# Patient Record
Sex: MALE | Race: WHITE | NOT HISPANIC OR LATINO
[De-identification: names, ages, dates, MRNs, and addresses within clinical notes are randomized per-mention and may not be internally consistent; named-entity substitution may affect disease eponyms.]

---

## 2017-09-20 ENCOUNTER — TRANSCRIPTION ENCOUNTER (OUTPATIENT)
Age: 62
End: 2017-09-20

## 2018-11-06 ENCOUNTER — APPOINTMENT (OUTPATIENT)
Dept: UROLOGY | Facility: CLINIC | Age: 63
End: 2018-11-06
Payer: MEDICAID

## 2018-11-06 DIAGNOSIS — N43.3 HYDROCELE, UNSPECIFIED: ICD-10-CM

## 2018-11-06 PROCEDURE — 99203 OFFICE O/P NEW LOW 30 MIN: CPT

## 2018-11-06 RX ORDER — ATORVASTATIN CALCIUM 80 MG/1
TABLET, FILM COATED ORAL
Refills: 0 | Status: ACTIVE | COMMUNITY

## 2018-11-08 ENCOUNTER — FORM ENCOUNTER (OUTPATIENT)
Age: 63
End: 2018-11-08

## 2018-11-09 ENCOUNTER — OUTPATIENT (OUTPATIENT)
Dept: OUTPATIENT SERVICES | Facility: HOSPITAL | Age: 63
LOS: 1 days | End: 2018-11-09
Payer: MEDICAID

## 2018-11-09 ENCOUNTER — APPOINTMENT (OUTPATIENT)
Dept: ULTRASOUND IMAGING | Facility: CLINIC | Age: 63
End: 2018-11-09
Payer: MEDICAID

## 2018-11-09 DIAGNOSIS — Z00.8 ENCOUNTER FOR OTHER GENERAL EXAMINATION: ICD-10-CM

## 2018-11-09 PROCEDURE — 76870 US EXAM SCROTUM: CPT

## 2018-11-09 PROCEDURE — 76870 US EXAM SCROTUM: CPT | Mod: 26

## 2018-12-14 ENCOUNTER — APPOINTMENT (OUTPATIENT)
Dept: UROLOGY | Facility: CLINIC | Age: 63
End: 2018-12-14

## 2020-07-13 ENCOUNTER — TRANSCRIPTION ENCOUNTER (OUTPATIENT)
Age: 65
End: 2020-07-13

## 2021-01-02 ENCOUNTER — EMERGENCY (EMERGENCY)
Facility: HOSPITAL | Age: 66
LOS: 1 days | Discharge: HOME | End: 2021-01-02
Attending: EMERGENCY MEDICINE
Payer: COMMERCIAL

## 2021-01-02 VITALS
SYSTOLIC BLOOD PRESSURE: 143 MMHG | DIASTOLIC BLOOD PRESSURE: 94 MMHG | TEMPERATURE: 98 F | RESPIRATION RATE: 19 BRPM | WEIGHT: 179.9 LBS | HEART RATE: 94 BPM | OXYGEN SATURATION: 94 % | HEIGHT: 68 IN

## 2021-01-02 VITALS
DIASTOLIC BLOOD PRESSURE: 72 MMHG | HEART RATE: 74 BPM | SYSTOLIC BLOOD PRESSURE: 125 MMHG | RESPIRATION RATE: 18 BRPM | OXYGEN SATURATION: 98 %

## 2021-01-02 DIAGNOSIS — Z98.890 OTHER SPECIFIED POSTPROCEDURAL STATES: Chronic | ICD-10-CM

## 2021-01-02 LAB
ALBUMIN SERPL ELPH-MCNC: 4.1 G/DL — SIGNIFICANT CHANGE UP (ref 3.3–5)
ALP SERPL-CCNC: 103 U/L — SIGNIFICANT CHANGE UP (ref 40–120)
ALT FLD-CCNC: 29 U/L — SIGNIFICANT CHANGE UP (ref 10–45)
ANION GAP SERPL CALC-SCNC: 11 MMOL/L — SIGNIFICANT CHANGE UP (ref 5–17)
APPEARANCE UR: CLEAR — SIGNIFICANT CHANGE UP
AST SERPL-CCNC: 15 U/L — SIGNIFICANT CHANGE UP (ref 10–40)
BACTERIA # UR AUTO: NEGATIVE — SIGNIFICANT CHANGE UP
BASOPHILS # BLD AUTO: 0 K/UL — SIGNIFICANT CHANGE UP (ref 0–0.2)
BASOPHILS NFR BLD AUTO: 0 % — SIGNIFICANT CHANGE UP (ref 0–2)
BILIRUB SERPL-MCNC: 0.3 MG/DL — SIGNIFICANT CHANGE UP (ref 0.2–1.2)
BILIRUB UR-MCNC: NEGATIVE — SIGNIFICANT CHANGE UP
BUN SERPL-MCNC: 19 MG/DL — SIGNIFICANT CHANGE UP (ref 7–23)
CALCIUM SERPL-MCNC: 9.3 MG/DL — SIGNIFICANT CHANGE UP (ref 8.4–10.5)
CHLORIDE SERPL-SCNC: 100 MMOL/L — SIGNIFICANT CHANGE UP (ref 96–108)
CO2 SERPL-SCNC: 26 MMOL/L — SIGNIFICANT CHANGE UP (ref 22–31)
COLOR SPEC: YELLOW — SIGNIFICANT CHANGE UP
CREAT SERPL-MCNC: 1.09 MG/DL — SIGNIFICANT CHANGE UP (ref 0.5–1.3)
DIFF PNL FLD: ABNORMAL
EOSINOPHIL # BLD AUTO: 0.12 K/UL — SIGNIFICANT CHANGE UP (ref 0–0.5)
EOSINOPHIL NFR BLD AUTO: 0.8 % — SIGNIFICANT CHANGE UP (ref 0–6)
EPI CELLS # UR: 1 /HPF — SIGNIFICANT CHANGE UP
GLUCOSE SERPL-MCNC: 107 MG/DL — HIGH (ref 70–99)
GLUCOSE UR QL: NEGATIVE — SIGNIFICANT CHANGE UP
HCT VFR BLD CALC: 42.8 % — SIGNIFICANT CHANGE UP (ref 39–50)
HGB BLD-MCNC: 14 G/DL — SIGNIFICANT CHANGE UP (ref 13–17)
HYALINE CASTS # UR AUTO: 5 /LPF — HIGH (ref 0–2)
KETONES UR-MCNC: SIGNIFICANT CHANGE UP
LEUKOCYTE ESTERASE UR-ACNC: ABNORMAL
LYMPHOCYTES # BLD AUTO: 21.4 % — SIGNIFICANT CHANGE UP (ref 13–44)
LYMPHOCYTES # BLD AUTO: 3.33 K/UL — HIGH (ref 1–3.3)
MANUAL SMEAR VERIFICATION: SIGNIFICANT CHANGE UP
MCHC RBC-ENTMCNC: 27.2 PG — SIGNIFICANT CHANGE UP (ref 27–34)
MCHC RBC-ENTMCNC: 32.7 GM/DL — SIGNIFICANT CHANGE UP (ref 32–36)
MCV RBC AUTO: 83.1 FL — SIGNIFICANT CHANGE UP (ref 80–100)
MONOCYTES # BLD AUTO: 1.87 K/UL — HIGH (ref 0–0.9)
MONOCYTES NFR BLD AUTO: 12 % — SIGNIFICANT CHANGE UP (ref 2–14)
NEUTROPHILS # BLD AUTO: 9.58 K/UL — HIGH (ref 1.8–7.4)
NEUTROPHILS NFR BLD AUTO: 61.5 % — SIGNIFICANT CHANGE UP (ref 43–77)
NITRITE UR-MCNC: NEGATIVE — SIGNIFICANT CHANGE UP
PH UR: 6 — SIGNIFICANT CHANGE UP (ref 5–8)
PLAT MORPH BLD: NORMAL — SIGNIFICANT CHANGE UP
PLATELET # BLD AUTO: 313 K/UL — SIGNIFICANT CHANGE UP (ref 150–400)
POTASSIUM SERPL-MCNC: 4 MMOL/L — SIGNIFICANT CHANGE UP (ref 3.5–5.3)
POTASSIUM SERPL-SCNC: 4 MMOL/L — SIGNIFICANT CHANGE UP (ref 3.5–5.3)
PROT SERPL-MCNC: 8.5 G/DL — HIGH (ref 6–8.3)
PROT UR-MCNC: ABNORMAL
RBC # BLD: 5.15 M/UL — SIGNIFICANT CHANGE UP (ref 4.2–5.8)
RBC # FLD: 11.9 % — SIGNIFICANT CHANGE UP (ref 10.3–14.5)
RBC BLD AUTO: NORMAL — SIGNIFICANT CHANGE UP
RBC CASTS # UR COMP ASSIST: 7 /HPF — HIGH (ref 0–4)
SARS-COV-2 RNA SPEC QL NAA+PROBE: SIGNIFICANT CHANGE UP
SODIUM SERPL-SCNC: 137 MMOL/L — SIGNIFICANT CHANGE UP (ref 135–145)
SP GR SPEC: 1.03 — HIGH (ref 1.01–1.02)
UROBILINOGEN FLD QL: NEGATIVE — SIGNIFICANT CHANGE UP
VARIANT LYMPHS # BLD: 4.3 % — SIGNIFICANT CHANGE UP (ref 0–6)
WBC # BLD: 15.58 K/UL — HIGH (ref 3.8–10.5)
WBC # FLD AUTO: 15.58 K/UL — HIGH (ref 3.8–10.5)
WBC UR QL: 43 /HPF — HIGH (ref 0–5)

## 2021-01-02 PROCEDURE — 81001 URINALYSIS AUTO W/SCOPE: CPT

## 2021-01-02 PROCEDURE — 87086 URINE CULTURE/COLONY COUNT: CPT

## 2021-01-02 PROCEDURE — 71045 X-RAY EXAM CHEST 1 VIEW: CPT

## 2021-01-02 PROCEDURE — 99284 EMERGENCY DEPT VISIT MOD MDM: CPT | Mod: 25

## 2021-01-02 PROCEDURE — 96374 THER/PROPH/DIAG INJ IV PUSH: CPT

## 2021-01-02 PROCEDURE — 99285 EMERGENCY DEPT VISIT HI MDM: CPT

## 2021-01-02 PROCEDURE — 74176 CT ABD & PELVIS W/O CONTRAST: CPT | Mod: 26

## 2021-01-02 PROCEDURE — 87635 SARS-COV-2 COVID-19 AMP PRB: CPT

## 2021-01-02 PROCEDURE — U0005: CPT

## 2021-01-02 PROCEDURE — 74176 CT ABD & PELVIS W/O CONTRAST: CPT

## 2021-01-02 PROCEDURE — 85025 COMPLETE CBC W/AUTO DIFF WBC: CPT

## 2021-01-02 PROCEDURE — 80053 COMPREHEN METABOLIC PANEL: CPT

## 2021-01-02 PROCEDURE — 71045 X-RAY EXAM CHEST 1 VIEW: CPT | Mod: 26

## 2021-01-02 RX ORDER — CEPHALEXIN 500 MG
1 CAPSULE ORAL
Qty: 10 | Refills: 0
Start: 2021-01-02 | End: 2021-01-06

## 2021-01-02 RX ORDER — CEFTRIAXONE 500 MG/1
1000 INJECTION, POWDER, FOR SOLUTION INTRAMUSCULAR; INTRAVENOUS ONCE
Refills: 0 | Status: COMPLETED | OUTPATIENT
Start: 2021-01-02 | End: 2021-01-02

## 2021-01-02 RX ADMIN — CEFTRIAXONE 100 MILLIGRAM(S): 500 INJECTION, POWDER, FOR SOLUTION INTRAMUSCULAR; INTRAVENOUS at 08:03

## 2021-01-02 NOTE — ED PROVIDER NOTE - NSFOLLOWUPINSTRUCTIONS_ED_ALL_ED_FT
No signs of emergency medical condition on today's workup.  Presumptive diagnosis made, but further evaluation may be required by your primary care doctor or specialist for a definitive diagnosis.  Therefore, follow up as directed and if symptoms change/worsen or any emergency conditions, please return to the ER.  Please follow up with primary care physician for CT results.   For pain or fever you can ibuprofen (motrin, advil) or tylenol as needed, as directed on packaging.   If needed, call patient access services at [phone] to find a primary care doctor, or call at [phone] to make an appointment at the clinic.

## 2021-01-02 NOTE — ED ADULT NURSE NOTE - OBJECTIVE STATEMENT
comes from home with 3day hx of chills, cough, aches; had been taking Advil regularly; denies sob; no previous positive covid tests; no known exposures to covid; pt is alert and oriented x 4; no acute distress noted; placed on isolation to rule out COVID; isolation precations explained to pt.

## 2021-01-02 NOTE — ED PROVIDER NOTE - NS ED ROS FT
CONSTITUTIONAL: CHILLS, FATIGUE. No fevers, lightheadedness, weakness, unexpected weight change  HEENT: No vision changes, discharge from eyes, nasal congestion, runny nose, sore throat, ear pain or discharge  CV: No chest pain  PULM: COUGH. No shortness of breath  GI: No abdominal pain, nausea, vomiting, diarrhea, constipation  : +difficulty urinating. No dysuria, polyuria, hematuria  SKIN: No rashes, swelling, open wounds or ulcers  MSK: No muscle aches, joint aches  NEURO: No headache

## 2021-01-02 NOTE — ED PROVIDER NOTE - PROGRESS NOTE DETAILS
Tino Jalloh D.O., PGY2 (Resident)  Patient with + RBC, leuk esterase, WBCs, no bacteria. Sterile pyuria. Given patient symptomatic, will tx w/ ceftriaxone, get CT a/p non contrast to eval for obstructive renal path Resident [place]: pt received as a sign out at resident signout - CT pending - no stone, panculitis noted, denies abd pain, nonttp - pt is informed abt results, follow up with primary care physician. Abx to pharmacy.

## 2021-01-02 NOTE — ED PROVIDER NOTE - PHYSICAL EXAMINATION
GENERAL: elderly male, sitting in bed, NAD. Vital signs are within normal limits  HEENT: NC/AT, conjunctiva noninjected, sclera anicteric, moist mucous membranes, no tonsillar exudate  NECK: Supple, trachea midline, no LAD  LUNG: Nonlabored respirations, no wheezes, rales  CV: RRR, no murmurs, Pulses- Radial: 2+ b/l  ABDOMEN: Nondistended, nontender  MSK: No visible deformities, nontender extremities, moving all 4 extremities spont.  NEURO: AAOx4 (to person, place, time, event), no tremor, steady gait  PSYCH: Normal mood and affect

## 2021-01-02 NOTE — ED PROVIDER NOTE - CLINICAL SUMMARY MEDICAL DECISION MAKING FREE TEXT BOX
65M well appearing here for uri sxs w/o fever, gradual onset headache, difficulty urinating w/o other urinary sxs. vitals wnl. Exam benign. Likely mild viral URI. Eval for possible covid pattern vs PNA on CXR. UA to eval for possible underlying urinary infection or hematuria given difficulty with urination. CXR. COVID swab. 65M well appearing here for uri sxs w/o fever, gradual onset headache, difficulty urinating w/o other urinary sxs. vitals wnl. Exam benign. Likely mild viral URI. Eval for possible covid pattern vs PNA on CXR. UA to eval for possible underlying urinary infection or hematuria given difficulty with urination. CXR. COVID swab.  No e/o sepsis.  Nontoxic appearing.  Hopeful d/c assuming no emergent conditions identified.  --BMM

## 2021-01-02 NOTE — ED PROVIDER NOTE - PATIENT PORTAL LINK FT
You can access the FollowMyHealth Patient Portal offered by [facility] by registering at the following website: [email or web address]. By joining "[facility]"’s FollowMyHealth portal, you will also be able to view your health information using other applications (apps) compatible with our system.

## 2021-01-03 LAB
CULTURE RESULTS: SIGNIFICANT CHANGE UP
SPECIMEN SOURCE: SIGNIFICANT CHANGE UP

## 2021-12-02 ENCOUNTER — TRANSCRIPTION ENCOUNTER (OUTPATIENT)
Age: 66
End: 2021-12-02

## 2023-12-30 ENCOUNTER — NON-APPOINTMENT (OUTPATIENT)
Age: 68
End: 2023-12-30

## 2024-08-12 ENCOUNTER — NON-APPOINTMENT (OUTPATIENT)
Age: 69
End: 2024-08-12

## 2024-08-14 PROBLEM — E78.5 HYPERLIPIDEMIA, UNSPECIFIED: Chronic | Status: ACTIVE | Noted: 2021-01-02

## 2024-08-15 ENCOUNTER — NON-APPOINTMENT (OUTPATIENT)
Age: 69
End: 2024-08-15

## 2024-08-15 ENCOUNTER — APPOINTMENT (OUTPATIENT)
Dept: INTERNAL MEDICINE | Facility: CLINIC | Age: 69
End: 2024-08-15
Payer: MEDICARE

## 2024-08-15 DIAGNOSIS — Z00.00 ENCOUNTER FOR GENERAL ADULT MEDICAL EXAMINATION W/OUT ABNORMAL FINDINGS: ICD-10-CM

## 2024-08-15 PROCEDURE — 94060 EVALUATION OF WHEEZING: CPT

## 2024-08-15 PROCEDURE — 94729 DIFFUSING CAPACITY: CPT

## 2024-08-15 PROCEDURE — 94726 PLETHYSMOGRAPHY LUNG VOLUMES: CPT

## 2024-08-19 ENCOUNTER — APPOINTMENT (OUTPATIENT)
Dept: INTERNAL MEDICINE | Facility: CLINIC | Age: 69
End: 2024-08-19
Payer: MEDICARE

## 2024-08-19 VITALS
DIASTOLIC BLOOD PRESSURE: 70 MMHG | HEART RATE: 63 BPM | OXYGEN SATURATION: 98 % | HEIGHT: 69 IN | BODY MASS INDEX: 27.4 KG/M2 | SYSTOLIC BLOOD PRESSURE: 104 MMHG | WEIGHT: 185 LBS | TEMPERATURE: 98 F

## 2024-08-19 DIAGNOSIS — Z87.891 PERSONAL HISTORY OF NICOTINE DEPENDENCE: ICD-10-CM

## 2024-08-19 DIAGNOSIS — F17.200 NICOTINE DEPENDENCE, UNSPECIFIED, UNCOMPLICATED: ICD-10-CM

## 2024-08-19 DIAGNOSIS — Z78.9 OTHER SPECIFIED HEALTH STATUS: ICD-10-CM

## 2024-08-19 DIAGNOSIS — R05.3 CHRONIC COUGH: ICD-10-CM

## 2024-08-19 DIAGNOSIS — J45.909 UNSPECIFIED ASTHMA, UNCOMPLICATED: ICD-10-CM

## 2024-08-19 DIAGNOSIS — J06.9 ACUTE UPPER RESPIRATORY INFECTION, UNSPECIFIED: ICD-10-CM

## 2024-08-19 PROCEDURE — 99214 OFFICE O/P EST MOD 30 MIN: CPT

## 2024-08-19 PROCEDURE — G2211 COMPLEX E/M VISIT ADD ON: CPT

## 2024-08-19 RX ORDER — ATORVASTATIN CALCIUM 20 MG/1
20 TABLET, FILM COATED ORAL
Qty: 90 | Refills: 0 | Status: ACTIVE | COMMUNITY
Start: 2023-12-28

## 2024-08-19 RX ORDER — PROMETHAZINE HYDROCHLORIDE AND DEXTROMETHORPHAN HYDROBROMIDE ORAL SOLUTION 15; 6.25 MG/5ML; MG/5ML
6.25-15 SOLUTION ORAL
Qty: 100 | Refills: 0 | Status: COMPLETED | COMMUNITY
Start: 2024-08-13

## 2024-08-19 RX ORDER — ALBUTEROL SULFATE 90 UG/1
108 (90 BASE) INHALANT RESPIRATORY (INHALATION)
Qty: 8 | Refills: 0 | Status: COMPLETED | COMMUNITY
Start: 2024-08-13

## 2024-08-19 NOTE — PROCEDURE
[FreeTextEntry1] : See scanned PFT report = discussed with patient in detail Normal spirometry/lung volumes/DLCO

## 2024-08-19 NOTE — HISTORY OF PRESENT ILLNESS
[Former] : former [< 20 pack-years] : < 20 pack-years [Never] : never [Feelings Of Weakness On Exertion] : exercise intolerance [Difficulty Breathing During Exertion] : dyspnea on exertion [Wheezing] : wheezing [Nasal Passage Blockage (Stuffiness)] : edema [Nonspecific Pain, Swelling, And Stiffness] : chest pain [Cough] : coughing [Fever] : fever [Does not check] : The patient is not checking peak flow at home [0  -  Nothing at all] : 0, nothing at all [Class I - No Symptoms and No Limitations] : I [Patient/Caregiver unclear of wishes] : Patient/Caregiver unclear of wishes [Wt Gain ___ kg] : No recent weight gain [Wt Loss ___ kg] : No recent weight loss [More Frequent Use Needed Recently] : Patient reports no recent increase in frequency of [TextBox_4] : The patient comes in for an initial pulmonary evaluation regarding persistent cough. He denies any history of pleurisy, remote bronchitis, tuberculosis, asthma, COPD, pulmonary embolism, sleep apnea, pneumothorax, lung cancer.  He reports a history of walking pneumonia treated with outpatient antibiotics.  He denies any history of lung surgery.  There is a history of lung cancer in the remote relative.  The patient is  and works in the carpet business.  He states that he has exposure to dusts.  He denies any radiation exposure.  He reports that he did have mold in his basement approximately 1 year ago.  He apparently did mold clearance using heavy-duty bleach.  Despite wearing a mask he reports that he did have a cough for several days later.  The patient discontinued smoking approximately 15 years ago.  Prior to that he states that he smoked 4 to 5 cigarettes/day for at least 20 years.  He denies alcohol or drug use.  He reports that he was born in [place].  He has lived in [place] and [place] since the 1970s.  He has not had any recent travel.  He did have a dog in his home last year.  He recently had a chest x-ray and PFTs.  He states that he is up-to-date with influenza, pneumococcal, and COVID vaccination.  He states that he has not had chest imaging with chest CT. He reports that he became ill with an upper respiratory illness.  He initially had rhinitis and postnasal drip.  He then developed a severe and persisting cough.  It was productive of sputum.  He eventually after approximately 4 weeks went to urgent care.  He was treated with an albuterol MDI and promethazine cough syrup.  He had a chest x-ray which was unremarkable.  He presently denies any chest pain.  He reports that his cough is completely gone.  He denies any sputum production or hemoptysis.  He denies any fevers/chills/sweats.  He denies any weight loss.  He denies any edema or calf pain. [TextBox_11] : Less than 1/2 pack/day [TextBox_13] : 20 years [YearQuit] : 15 years ago [de-identified] : Denies hemoptysis [ESS] : 0 [TextBox_57] : 2024 = clear chest x-ray [AdvancecareDate] : 08/24

## 2024-08-19 NOTE — PHYSICAL EXAM
[No Acute Distress] : no acute distress [Well Nourished] : well nourished [Well Groomed] : well groomed [Well Developed] : well developed [Normal Oropharynx] : normal oropharynx [Supple] : supple [No JVD] : no jvd [Normal Rate/Rhythm] : normal rate/rhythm [Normal S1, S2] : normal s1, s2 [No Resp Distress] : no resp distress [No Acc Muscle Use] : no acc muscle use [Normal Rhythm and Effort] : normal rhythm and effort [Clear to Auscultation Bilaterally] : clear to auscultation bilaterally [No Abnormalities] : no abnormalities [Benign] : benign [Normal Gait] : normal gait [No Clubbing] : no clubbing [No Cyanosis] : no cyanosis [No Edema] : no edema [FROM] : FROM [Normal Color/ Pigmentation] : normal color/ pigmentation [No Focal Deficits] : no focal deficits [Oriented x3] : oriented x3 [Normal Affect] : normal affect [TextBox_11] : No sinus tenderness; pupils reactive to light; extraocular muscles intact; TMs clear [TextBox_44] : No stridor [TextBox_54] : No cords [TextBox_68] : R = 16; normal air entry; no wheezing

## 2024-08-19 NOTE — ASSESSMENT
[FreeTextEntry1] : Former smoker Prior history of walking pneumonia Recent acute likely viral URI Developed persistent cough in setting of persistent postnasal drip and probable asthmatic bronchitis triggered by viral illness Cough has completely resolved Recent chest x-ray clear Recent PFTs within normal limits Vaccines reviewed No longer smokes Exercise program encouraged Weight control Carry albuterol MDI for as needed use Should he develop a recurrent URI he will come in to be seen as soon as possible At this time he wishes to return in follow-up as needed He will call if his status changes or worsens or for any pulmonary issues and return to be seen immediately All of the above was discussed in detail with him and all questions were answered He verbally confirmed understanding of all of the above and agreement with the above plan

## 2024-11-20 PROBLEM — K40.90 RIGHT INGUINAL HERNIA: Status: ACTIVE | Noted: 2024-11-20

## 2024-11-21 ENCOUNTER — APPOINTMENT (OUTPATIENT)
Dept: SURGERY | Facility: CLINIC | Age: 69
End: 2024-11-21
Payer: MEDICARE

## 2024-11-21 VITALS
HEIGHT: 69 IN | RESPIRATION RATE: 16 BRPM | TEMPERATURE: 97.3 F | HEART RATE: 65 BPM | WEIGHT: 185 LBS | OXYGEN SATURATION: 99 % | DIASTOLIC BLOOD PRESSURE: 75 MMHG | SYSTOLIC BLOOD PRESSURE: 137 MMHG | BODY MASS INDEX: 27.4 KG/M2

## 2024-11-21 DIAGNOSIS — K40.90 UNILATERAL INGUINAL HERNIA, W/OUT OBSTRUCTION OR GANGRENE, NOT SPECIFIED AS RECURRENT: ICD-10-CM

## 2024-11-21 PROCEDURE — 99203 OFFICE O/P NEW LOW 30 MIN: CPT

## 2024-12-04 ENCOUNTER — OUTPATIENT (OUTPATIENT)
Dept: OUTPATIENT SERVICES | Facility: HOSPITAL | Age: 69
LOS: 1 days | End: 2024-12-04
Payer: COMMERCIAL

## 2024-12-04 VITALS
HEART RATE: 66 BPM | TEMPERATURE: 99 F | SYSTOLIC BLOOD PRESSURE: 120 MMHG | DIASTOLIC BLOOD PRESSURE: 80 MMHG | OXYGEN SATURATION: 97 % | RESPIRATION RATE: 16 BRPM | HEIGHT: 68 IN

## 2024-12-04 DIAGNOSIS — Z98.890 OTHER SPECIFIED POSTPROCEDURAL STATES: Chronic | ICD-10-CM

## 2024-12-04 DIAGNOSIS — K40.90 UNILATERAL INGUINAL HERNIA, WITHOUT OBSTRUCTION OR GANGRENE, NOT SPECIFIED AS RECURRENT: ICD-10-CM

## 2024-12-04 DIAGNOSIS — Z87.19 PERSONAL HISTORY OF OTHER DISEASES OF THE DIGESTIVE SYSTEM: ICD-10-CM

## 2024-12-04 LAB
ANION GAP SERPL CALC-SCNC: 11 MMOL/L — SIGNIFICANT CHANGE UP (ref 5–17)
BUN SERPL-MCNC: 16 MG/DL — SIGNIFICANT CHANGE UP (ref 7–23)
CALCIUM SERPL-MCNC: 9.1 MG/DL — SIGNIFICANT CHANGE UP (ref 8.4–10.5)
CHLORIDE SERPL-SCNC: 104 MMOL/L — SIGNIFICANT CHANGE UP (ref 96–108)
CO2 SERPL-SCNC: 23 MMOL/L — SIGNIFICANT CHANGE UP (ref 22–31)
CREAT SERPL-MCNC: 0.85 MG/DL — SIGNIFICANT CHANGE UP (ref 0.5–1.3)
EGFR: 94 ML/MIN/1.73M2 — SIGNIFICANT CHANGE UP
GLUCOSE SERPL-MCNC: 88 MG/DL — SIGNIFICANT CHANGE UP (ref 70–99)
HCT VFR BLD CALC: 44.4 % — SIGNIFICANT CHANGE UP (ref 39–50)
HGB BLD-MCNC: 14.3 G/DL — SIGNIFICANT CHANGE UP (ref 13–17)
MCHC RBC-ENTMCNC: 26.3 PG — LOW (ref 27–34)
MCHC RBC-ENTMCNC: 32.2 G/DL — SIGNIFICANT CHANGE UP (ref 32–36)
MCV RBC AUTO: 81.6 FL — SIGNIFICANT CHANGE UP (ref 80–100)
MRSA PCR RESULT.: SIGNIFICANT CHANGE UP
NRBC # BLD: 0 /100 WBCS — SIGNIFICANT CHANGE UP (ref 0–0)
PLATELET # BLD AUTO: 303 K/UL — SIGNIFICANT CHANGE UP (ref 150–400)
POTASSIUM SERPL-MCNC: 4.4 MMOL/L — SIGNIFICANT CHANGE UP (ref 3.5–5.3)
POTASSIUM SERPL-SCNC: 4.4 MMOL/L — SIGNIFICANT CHANGE UP (ref 3.5–5.3)
RBC # BLD: 5.44 M/UL — SIGNIFICANT CHANGE UP (ref 4.2–5.8)
RBC # FLD: 12.3 % — SIGNIFICANT CHANGE UP (ref 10.3–14.5)
S AUREUS DNA NOSE QL NAA+PROBE: DETECTED
SODIUM SERPL-SCNC: 138 MMOL/L — SIGNIFICANT CHANGE UP (ref 135–145)
WBC # BLD: 9.43 K/UL — SIGNIFICANT CHANGE UP (ref 3.8–10.5)
WBC # FLD AUTO: 9.43 K/UL — SIGNIFICANT CHANGE UP (ref 3.8–10.5)

## 2024-12-04 PROCEDURE — 87640 STAPH A DNA AMP PROBE: CPT

## 2024-12-04 PROCEDURE — 87641 MR-STAPH DNA AMP PROBE: CPT

## 2024-12-04 PROCEDURE — G0463: CPT

## 2024-12-04 PROCEDURE — 85027 COMPLETE CBC AUTOMATED: CPT

## 2024-12-04 PROCEDURE — 80048 BASIC METABOLIC PNL TOTAL CA: CPT

## 2024-12-04 NOTE — H&P PST ADULT - HISTORY OF PRESENT ILLNESS
68 yo male, PMH HLD, left inguinal hernia repair several years ago, reports acute pain on the right groin area right after her picked up something heavy, no bulge. Pt. presents to PST for scheduled Robotic Right Inguinal Repair with Mesh on 12/11/24. Denies recent fever, chills, chest pain, SOB, changes in bowel/urinary habits or recent exposure to COVID-19 infection. Pt. denies clotting or bleeding disorders.

## 2024-12-04 NOTE — H&P PST ADULT - NEGATIVE GASTROINTESTINAL SYMPTOMS
no nausea/no vomiting/no change in bowel habits no nausea/no vomiting/no change in bowel habits/no flatulence/no abdominal pain/no melena/no hematochezia/no steatorrhea

## 2024-12-04 NOTE — H&P PST ADULT - NEGATIVE GENERAL GENITOURINARY SYMPTOMS
no bladder infections/normal urinary frequency no hematuria/no bladder infections/normal urinary frequency

## 2024-12-04 NOTE — H&P PST ADULT - NSICDXPROCEDURE_GEN_ALL_CORE_FT
PROCEDURES:  Laparoscopy with repair of recurrent inguinal hernia 04-Dec-2024 15:14:55  Cookie Gonzalez

## 2024-12-04 NOTE — H&P PST ADULT - PROBLEM SELECTOR PLAN 1
Robot right Inguinal Hernia Repair with Mesh on 12/11/24  Pre-op instructions, including Chlorhexidine soap, provided - all questions answered  CBC, BMP done at [facility]  Pt. was told he can continue aspirin 81 mg. during porsha-op period

## 2024-12-04 NOTE — H&P PST ADULT - NSICDXPASTMEDICALHX_GEN_ALL_CORE_FT
PAST MEDICAL HISTORY:  HLD (hyperlipidemia)      PAST MEDICAL HISTORY:  History of neck pain     History of right inguinal hernia     HLD (hyperlipidemia)

## 2024-12-04 NOTE — H&P PST ADULT - NEGATIVE NEUROLOGICAL SYMPTOMS
no transient paralysis/no weakness/no vertigo no transient paralysis/no weakness/no syncope/no tremors/no vertigo/no difficulty walking

## 2024-12-04 NOTE — H&P PST ADULT - ASSESSMENT
Activity:    DASI scale:    Mallampati:    Dentition: carries heave object at his work Activity: My job requires me do a lot of physical work, that's how I got hurt, picking up something heavy    DASI scale: 8.25    Mallampati: 2    Dentition: Denies loose teeth/dentures

## 2024-12-05 DIAGNOSIS — A49.01 METHICILLIN SUSCEPTIBLE STAPHYLOCOCCUS AUREUS INFECTION, UNSPECIFIED SITE: ICD-10-CM

## 2024-12-05 RX ORDER — MUPIROCIN 20 MG/G
2 OINTMENT TOPICAL
Qty: 1 | Refills: 0 | Status: ACTIVE | COMMUNITY
Start: 2024-12-05 | End: 1900-01-01

## 2024-12-11 ENCOUNTER — TRANSCRIPTION ENCOUNTER (OUTPATIENT)
Age: 69
End: 2024-12-11

## 2024-12-11 ENCOUNTER — APPOINTMENT (OUTPATIENT)
Dept: SURGERY | Facility: HOSPITAL | Age: 69
End: 2024-12-11
Payer: MEDICARE

## 2024-12-11 ENCOUNTER — OUTPATIENT (OUTPATIENT)
Dept: OUTPATIENT SERVICES | Facility: HOSPITAL | Age: 69
LOS: 1 days | End: 2024-12-11
Payer: MEDICARE

## 2024-12-11 VITALS
HEART RATE: 83 BPM | SYSTOLIC BLOOD PRESSURE: 129 MMHG | OXYGEN SATURATION: 100 % | RESPIRATION RATE: 16 BRPM | DIASTOLIC BLOOD PRESSURE: 85 MMHG | TEMPERATURE: 98 F

## 2024-12-11 VITALS
OXYGEN SATURATION: 97 % | HEIGHT: 67.99 IN | HEART RATE: 65 BPM | RESPIRATION RATE: 16 BRPM | TEMPERATURE: 98 F | DIASTOLIC BLOOD PRESSURE: 78 MMHG | SYSTOLIC BLOOD PRESSURE: 137 MMHG | WEIGHT: 184.97 LBS

## 2024-12-11 DIAGNOSIS — K40.90 UNILATERAL INGUINAL HERNIA, WITHOUT OBSTRUCTION OR GANGRENE, NOT SPECIFIED AS RECURRENT: ICD-10-CM

## 2024-12-11 DIAGNOSIS — Z98.890 OTHER SPECIFIED POSTPROCEDURAL STATES: Chronic | ICD-10-CM

## 2024-12-11 PROCEDURE — 49650 LAP ING HERNIA REPAIR INIT: CPT | Mod: RT

## 2024-12-11 PROCEDURE — C1781: CPT

## 2024-12-11 PROCEDURE — C9399: CPT

## 2024-12-11 PROCEDURE — S2900: CPT

## 2024-12-11 DEVICE — MESH HERNIA INGUINAL PROGRIP LAPAROSCOPIC 15 X 10CM RIGHT: Type: IMPLANTABLE DEVICE | Site: RIGHT | Status: FUNCTIONAL

## 2024-12-11 RX ORDER — 0.9 % SODIUM CHLORIDE 0.9 %
1000 INTRAVENOUS SOLUTION INTRAVENOUS
Refills: 0 | Status: DISCONTINUED | OUTPATIENT
Start: 2024-12-11 | End: 2024-12-11

## 2024-12-11 RX ORDER — LIDOCAINE HCL 20 MG/ML
0.2 VIAL (ML) INJECTION ONCE
Refills: 0 | Status: DISCONTINUED | OUTPATIENT
Start: 2024-12-11 | End: 2024-12-11

## 2024-12-11 RX ORDER — CEFAZOLIN SODIUM 10 G
2000 VIAL (EA) INJECTION ONCE
Refills: 0 | Status: COMPLETED | OUTPATIENT
Start: 2024-12-11 | End: 2024-12-11

## 2024-12-11 RX ORDER — IBUPROFEN 200 MG
1 TABLET ORAL
Refills: 0 | DISCHARGE

## 2024-12-11 RX ORDER — HYDROMORPHONE HYDROCHLORIDE 2 MG/1
0.2 TABLET ORAL
Refills: 0 | Status: DISCONTINUED | OUTPATIENT
Start: 2024-12-11 | End: 2024-12-11

## 2024-12-11 RX ORDER — ONDANSETRON HYDROCHLORIDE 4 MG/1
4 TABLET, FILM COATED ORAL ONCE
Refills: 0 | Status: DISCONTINUED | OUTPATIENT
Start: 2024-12-11 | End: 2024-12-11

## 2024-12-11 RX ORDER — CHOLECALCIFEROL (VITAMIN D3) 10MCG/0.25
0 DROPS ORAL
Refills: 0 | DISCHARGE

## 2024-12-11 RX ORDER — SODIUM CHLORIDE 9 MG/ML
3 INJECTION, SOLUTION INTRAMUSCULAR; INTRAVENOUS; SUBCUTANEOUS EVERY 8 HOURS
Refills: 0 | Status: DISCONTINUED | OUTPATIENT
Start: 2024-12-11 | End: 2024-12-11

## 2024-12-11 RX ORDER — CHLORHEXIDINE GLUCONATE 1.2 MG/ML
1 RINSE ORAL ONCE
Refills: 0 | Status: DISCONTINUED | OUTPATIENT
Start: 2024-12-11 | End: 2024-12-11

## 2024-12-11 RX ORDER — OXYCODONE HYDROCHLORIDE 30 MG/1
5 TABLET ORAL ONCE
Refills: 0 | Status: DISCONTINUED | OUTPATIENT
Start: 2024-12-11 | End: 2024-12-11

## 2024-12-11 RX ORDER — HYDROMORPHONE HYDROCHLORIDE 2 MG/1
0.5 TABLET ORAL
Refills: 0 | Status: DISCONTINUED | OUTPATIENT
Start: 2024-12-11 | End: 2024-12-11

## 2024-12-11 RX ADMIN — Medication 75 MILLILITER(S): at 18:54

## 2024-12-11 RX ADMIN — HYDROMORPHONE HYDROCHLORIDE 0.2 MILLIGRAM(S): 2 TABLET ORAL at 20:10

## 2024-12-11 RX ADMIN — OXYCODONE HYDROCHLORIDE 5 MILLIGRAM(S): 30 TABLET ORAL at 21:15

## 2024-12-11 RX ADMIN — OXYCODONE HYDROCHLORIDE 5 MILLIGRAM(S): 30 TABLET ORAL at 20:45

## 2024-12-11 RX ADMIN — HYDROMORPHONE HYDROCHLORIDE 0.2 MILLIGRAM(S): 2 TABLET ORAL at 18:55

## 2024-12-11 RX ADMIN — HYDROMORPHONE HYDROCHLORIDE 0.2 MILLIGRAM(S): 2 TABLET ORAL at 19:55

## 2024-12-11 RX ADMIN — HYDROMORPHONE HYDROCHLORIDE 0.2 MILLIGRAM(S): 2 TABLET ORAL at 19:10

## 2024-12-11 NOTE — PRE-ANESTHESIA EVALUATION ADULT - NSANTHPMHFT_GEN_ALL_CORE
70 yo male, PMH HLD, left inguinal hernia repair several years ago, reports acute pain on the right groin area right after her picked up something heavy, no bulge. Pt. presents to PST for scheduled Robotic Right Inguinal Repair with Mesh on 12/11/24. Denies recent fever, chills, chest pain, SOB, changes in bowel/urinary habits or recent exposure to COVID-19 infection. Pt. denies clotting or bleeding disorders.

## 2024-12-11 NOTE — BRIEF OPERATIVE NOTE - OPERATION/FINDINGS
right direct inguinal hernia with a lipoma of the cord. Transabdominal preperitoneal repair performed using a [id number] mesh (Right anatomical progrip mesh).

## 2024-12-11 NOTE — ASU DISCHARGE PLAN (ADULT/PEDIATRIC) - CARE PROVIDER_API CALL
Yordy Perla  Surgery  [address] [place] [address], [address]  [place] [address]  Phone: [phone]  Fax: [phone]  Established Patient  Follow Up Time: 2 weeks  
Confusion

## 2024-12-11 NOTE — ASU DISCHARGE PLAN (ADULT/PEDIATRIC) - ASU DC SPECIAL INSTRUCTIONSFT
WOUND CARE:  Please keep incisions clean and dry. Please do not Scrub or rub incisions. Do not use lotion or powder on incisions. CAN REMOVE OP SITES (GAUZE/CLEAR DRESSING) IN 2 DAYS. PLEASE DO NOT REMOVE STERI STRIPS - THEY WILL FALL OFF ON THEIR OWN OR DR. DIMAS WILL REMOVE THEM AT YOUR NEXT POSTOPERATIVE APPOINTMENT.   BATHING: Please do not submerge wound underwater. You may shower and/or sponge bathe. CAN SHOWER WHEN ABLE.   ACTIVITY: No heavy lifting or straining. Otherwise, you may return to your usual level of physical activity. If you are taking narcotic pain medication (such as Percocet) DO NOT drive a car, operate machinery or make important decisions. CAN TAKE TYLENOL 1000MG Q6H AND MOTRIN 600MG Q6H ALTERNATING FOR PAIN SO THAT ONE IS TAKEN EVERY 3 HOURS.   DIET: Return to your usual diet.  NOTIFY YOUR SURGEON IF: You have any bleeding that does not stop, any pus draining from your wound(s), any fever (over 100.4 F) or chills, persistent nausea/vomiting, persistent diarrhea, or if your pain is not controlled on your discharge pain medications.  FOLLOW-UP: Please follow up with your primary care physician. Please follow-up with your surgeon, within 2 WEEKS - please call to schedule an appointment.

## 2024-12-11 NOTE — ASU DISCHARGE PLAN (ADULT/PEDIATRIC) - FINANCIAL ASSISTANCE
[facility] provides services at a reduced cost to those who are determined to be eligible through [facility]’s financial assistance program. Information regarding [facility]’s financial assistance program can be found by going to [email or web address] or by calling [phone].

## 2024-12-11 NOTE — ASU DISCHARGE PLAN (ADULT/PEDIATRIC) - MEDICATION INSTRUCTIONS
CAN TAKE TYLENOL 1000MG Q6H AND MOTRIN 600MG Q6H ALTERNATING FOR PAIN SO THAT ONE IS TAKEN EVERY 3 HOURS.

## 2024-12-11 NOTE — ASU PATIENT PROFILE, ADULT - FALL HARM RISK - UNIVERSAL INTERVENTIONS
Bed in lowest position, wheels locked, appropriate side rails in place/Call bell, personal items and telephone in reach/Instruct patient to call for assistance before getting out of bed or chair/Non-slip footwear when patient is out of bed/[place] to call system/Physically safe environment - no spills, clutter or unnecessary equipment/Purposeful Proactive Rounding/Room/bathroom lighting operational, light cord in reach

## 2024-12-12 ENCOUNTER — NON-APPOINTMENT (OUTPATIENT)
Age: 69
End: 2024-12-12

## 2024-12-13 PROBLEM — Z87.39 PERSONAL HISTORY OF OTHER DISEASES OF THE MUSCULOSKELETAL SYSTEM AND CONNECTIVE TISSUE: Chronic | Status: ACTIVE | Noted: 2024-12-04

## 2024-12-13 PROBLEM — Z87.19 PERSONAL HISTORY OF OTHER DISEASES OF THE DIGESTIVE SYSTEM: Chronic | Status: ACTIVE | Noted: 2024-12-04

## 2024-12-19 ENCOUNTER — APPOINTMENT (OUTPATIENT)
Dept: SURGERY | Facility: CLINIC | Age: 69
End: 2024-12-19
Payer: MEDICARE

## 2024-12-19 VITALS
OXYGEN SATURATION: 99 % | TEMPERATURE: 97.1 F | SYSTOLIC BLOOD PRESSURE: 120 MMHG | DIASTOLIC BLOOD PRESSURE: 71 MMHG | HEART RATE: 68 BPM | WEIGHT: 185 LBS | HEIGHT: 69 IN | RESPIRATION RATE: 16 BRPM | BODY MASS INDEX: 27.4 KG/M2

## 2024-12-19 DIAGNOSIS — K40.90 UNILATERAL INGUINAL HERNIA, W/OUT OBSTRUCTION OR GANGRENE, NOT SPECIFIED AS RECURRENT: ICD-10-CM

## 2024-12-19 PROCEDURE — 99024 POSTOP FOLLOW-UP VISIT: CPT

## 2025-01-16 ENCOUNTER — APPOINTMENT (OUTPATIENT)
Dept: SURGERY | Facility: CLINIC | Age: 70
End: 2025-01-16
Payer: MEDICARE

## 2025-01-16 VITALS
RESPIRATION RATE: 16 BRPM | TEMPERATURE: 96.6 F | OXYGEN SATURATION: 99 % | SYSTOLIC BLOOD PRESSURE: 130 MMHG | DIASTOLIC BLOOD PRESSURE: 83 MMHG | HEIGHT: 69 IN | HEART RATE: 71 BPM | WEIGHT: 185 LBS | BODY MASS INDEX: 27.4 KG/M2

## 2025-01-16 DIAGNOSIS — K40.90 UNILATERAL INGUINAL HERNIA, W/OUT OBSTRUCTION OR GANGRENE, NOT SPECIFIED AS RECURRENT: ICD-10-CM

## 2025-01-16 PROCEDURE — 99024 POSTOP FOLLOW-UP VISIT: CPT

## (undated) DEVICE — POSITIONER FOAM HEAD CRADLE (PINK)

## (undated) DEVICE — LAP PAD 18 X 18"

## (undated) DEVICE — XI DRAPE ARM

## (undated) DEVICE — XI DRAPE COLUMN

## (undated) DEVICE — SUT VLOC 180 3-0 9" V-20 GREEN

## (undated) DEVICE — ELCTR BOVIE PENCIL SMOKE EVACUATION

## (undated) DEVICE — XI ARM FORCEP FENESTRATED BIPOLAR 8MM

## (undated) DEVICE — DRAPE MAYO STAND 30"

## (undated) DEVICE — FOLEY TRAY 16FR 5CC LF UMETER CLOSED

## (undated) DEVICE — GLV 7.5 PROTEXIS (WHITE)

## (undated) DEVICE — SOL IRR POUR NS 0.9% 500ML

## (undated) DEVICE — POSITIONER FOAM EGG CRATE ULNAR 2PCS (PINK)

## (undated) DEVICE — VENODYNE/SCD SLEEVE CALF MEDIUM

## (undated) DEVICE — DRAIN PENROSE .25" X 18" LATEX

## (undated) DEVICE — NDL HYPO REGULAR BEVEL 25G X 1.5" (BLUE)

## (undated) DEVICE — MEDICATION LABELS W MARKER

## (undated) DEVICE — TUBING OLYMPUS INSUFFLATION

## (undated) DEVICE — DRAIN PENROSE .5" X 18" LATEX

## (undated) DEVICE — FOLEY TRAY 16FR 5CC LTX UMETER CLOSED

## (undated) DEVICE — DRSG OPSITE 13.75 X 4"

## (undated) DEVICE — ELCTR BOVIE TIP BLADE INSULATED 2.75" EDGE

## (undated) DEVICE — SUT POLYSORB 1 60" TIES

## (undated) DEVICE — SUT POLYSORB 0 30" GU-45

## (undated) DEVICE — TROCAR COVIDIEN ENDO CLOSE SUTURING DEVICE

## (undated) DEVICE — WARMING BLANKET LOWER ADULT

## (undated) DEVICE — XI TIP COVER

## (undated) DEVICE — TUBING STRYKEFLOW II SUCTION / IRRIGATOR

## (undated) DEVICE — WARMING BLANKET UPPER ADULT

## (undated) DEVICE — PACK ROBOTIC LIJ

## (undated) DEVICE — VISITEC 4X4

## (undated) DEVICE — XI OBTURATOR OPTICAL BLADELESS 8MM

## (undated) DEVICE — TUBING AIRSEAL TRI-LUMEN FILTERED

## (undated) DEVICE — SOL IRR POUR H2O 250ML

## (undated) DEVICE — XI VESSEL SEALER

## (undated) DEVICE — TUBING SUCTION 20FT

## (undated) DEVICE — SUT MONOCRYL 4-0 27" PS-2 UNDYED

## (undated) DEVICE — PREP CHLORAPREP HI-LITE ORANGE 26ML

## (undated) DEVICE — VENODYNE/SCD SLEEVE FOOT

## (undated) DEVICE — TUBING INSUFFLATION LAP FILTER 10FT

## (undated) DEVICE — DRAPE INSTRUMENT POUCH 6.75" X 11"

## (undated) DEVICE — SPECIMEN CONTAINER 100ML

## (undated) DEVICE — D HELP - CLEARVIEW CLEARIFY SYSTEM

## (undated) DEVICE — XI ARM SCISSOR MONO CURVED

## (undated) DEVICE — FOLEY HOLDER STATLOCK 2 WAY ADULT

## (undated) DEVICE — DRSG TEGADERM 6 X 8"

## (undated) DEVICE — DRSG STERISTRIPS 0.5 X 4"

## (undated) DEVICE — MARKING PEN W RULER

## (undated) DEVICE — TROCAR SURGIQUEST AIRSEAL 5MM X 100MM

## (undated) DEVICE — XI ARM NEEDLE DRIVER SUTURECUT MEGA 8MM

## (undated) DEVICE — DRAPE BACK TABLE COVER HEAVY DUTY 60"

## (undated) DEVICE — POSITIONER PURPLE ARM ONE STEP (LARGE)